# Patient Record
Sex: FEMALE | Race: WHITE | Employment: UNEMPLOYED | URBAN - METROPOLITAN AREA
[De-identification: names, ages, dates, MRNs, and addresses within clinical notes are randomized per-mention and may not be internally consistent; named-entity substitution may affect disease eponyms.]

---

## 2024-10-01 ENCOUNTER — APPOINTMENT (OUTPATIENT)
Dept: LAB | Facility: CLINIC | Age: 64
End: 2024-10-01

## 2024-10-01 ENCOUNTER — OFFICE VISIT (OUTPATIENT)
Dept: FAMILY MEDICINE CLINIC | Facility: CLINIC | Age: 64
End: 2024-10-01

## 2024-10-01 VITALS
WEIGHT: 160.4 LBS | OXYGEN SATURATION: 99 % | HEART RATE: 72 BPM | RESPIRATION RATE: 18 BRPM | BODY MASS INDEX: 32.34 KG/M2 | DIASTOLIC BLOOD PRESSURE: 92 MMHG | TEMPERATURE: 98.2 F | SYSTOLIC BLOOD PRESSURE: 164 MMHG | HEIGHT: 59 IN

## 2024-10-01 DIAGNOSIS — Z11.1 ENCOUNTER FOR SCREENING FOR RESPIRATORY TUBERCULOSIS: ICD-10-CM

## 2024-10-01 DIAGNOSIS — I10 PRIMARY HYPERTENSION: Primary | ICD-10-CM

## 2024-10-01 DIAGNOSIS — E66.09 CLASS 1 OBESITY DUE TO EXCESS CALORIES WITH SERIOUS COMORBIDITY AND BODY MASS INDEX (BMI) OF 32.0 TO 32.9 IN ADULT: ICD-10-CM

## 2024-10-01 DIAGNOSIS — E66.811 CLASS 1 OBESITY DUE TO EXCESS CALORIES WITH SERIOUS COMORBIDITY AND BODY MASS INDEX (BMI) OF 32.0 TO 32.9 IN ADULT: ICD-10-CM

## 2024-10-01 DIAGNOSIS — I10 PRIMARY HYPERTENSION: ICD-10-CM

## 2024-10-01 LAB
BASOPHILS # BLD AUTO: 0.03 THOUSANDS/ΜL (ref 0–0.1)
BASOPHILS NFR BLD AUTO: 0 % (ref 0–1)
EOSINOPHIL # BLD AUTO: 0.26 THOUSAND/ΜL (ref 0–0.61)
EOSINOPHIL NFR BLD AUTO: 3 % (ref 0–6)
ERYTHROCYTE [DISTWIDTH] IN BLOOD BY AUTOMATED COUNT: 13.2 % (ref 11.6–15.1)
EST. AVERAGE GLUCOSE BLD GHB EST-MCNC: 123 MG/DL
HBA1C MFR BLD: 5.9 %
HCT VFR BLD AUTO: 42.4 % (ref 34.8–46.1)
HGB BLD-MCNC: 13.5 G/DL (ref 11.5–15.4)
IMM GRANULOCYTES # BLD AUTO: 0.04 THOUSAND/UL (ref 0–0.2)
IMM GRANULOCYTES NFR BLD AUTO: 1 % (ref 0–2)
LYMPHOCYTES # BLD AUTO: 2.28 THOUSANDS/ΜL (ref 0.6–4.47)
LYMPHOCYTES NFR BLD AUTO: 27 % (ref 14–44)
MCH RBC QN AUTO: 30.4 PG (ref 26.8–34.3)
MCHC RBC AUTO-ENTMCNC: 31.8 G/DL (ref 31.4–37.4)
MCV RBC AUTO: 96 FL (ref 82–98)
MONOCYTES # BLD AUTO: 0.76 THOUSAND/ΜL (ref 0.17–1.22)
MONOCYTES NFR BLD AUTO: 9 % (ref 4–12)
NEUTROPHILS # BLD AUTO: 5.11 THOUSANDS/ΜL (ref 1.85–7.62)
NEUTS SEG NFR BLD AUTO: 60 % (ref 43–75)
NRBC BLD AUTO-RTO: 0 /100 WBCS
PLATELET # BLD AUTO: 310 THOUSANDS/UL (ref 149–390)
PMV BLD AUTO: 11.2 FL (ref 8.9–12.7)
RBC # BLD AUTO: 4.44 MILLION/UL (ref 3.81–5.12)
WBC # BLD AUTO: 8.48 THOUSAND/UL (ref 4.31–10.16)

## 2024-10-01 PROCEDURE — 99204 OFFICE O/P NEW MOD 45 MIN: CPT | Performed by: FAMILY MEDICINE

## 2024-10-01 PROCEDURE — 80053 COMPREHEN METABOLIC PANEL: CPT

## 2024-10-01 PROCEDURE — 83036 HEMOGLOBIN GLYCOSYLATED A1C: CPT

## 2024-10-01 PROCEDURE — 85025 COMPLETE CBC W/AUTO DIFF WBC: CPT

## 2024-10-01 PROCEDURE — 80061 LIPID PANEL: CPT

## 2024-10-01 PROCEDURE — 86480 TB TEST CELL IMMUN MEASURE: CPT

## 2024-10-01 PROCEDURE — 36415 COLL VENOUS BLD VENIPUNCTURE: CPT

## 2024-10-01 RX ORDER — CANDESARTAN 16 MG/1
16 TABLET ORAL DAILY
COMMUNITY
End: 2024-10-01 | Stop reason: SDUPTHER

## 2024-10-01 RX ORDER — CANDESARTAN 16 MG/1
16 TABLET ORAL DAILY
Qty: 30 TABLET | Refills: 2 | Status: SHIPPED | OUTPATIENT
Start: 2024-10-01

## 2024-10-01 NOTE — PROGRESS NOTES
Ambulatory Visit  Name: Cornelia Tirado      : 1960      MRN: [id number]  Encounter Provider: Shukri Mckeon MD  Encounter Date: 10/1/2024   Encounter department: [facility]    Assessment & Plan  Primary hypertension  Blood pressure elevated today  Patient did not take medications today  Counseled patient on importance of medication adherence  Continue candesartan  Counseled on DASH diet    Orders:    candesartan (ATACAND) 16 mg tablet; Take 1 tablet (16 mg total) by mouth daily    Comprehensive metabolic panel; Future    CBC and differential; Future    Lipid panel; Future    Hemoglobin A1C; Future    Encounter for screening for respiratory tuberculosis    Orders:    Quantiferon TB Gold Plus Assay; Future    Class 1 obesity due to excess calories with serious comorbidity and body mass index (BMI) of 32.0 to 32.9 in adult         BMI Counseling: Body mass index is 32.67 kg/m². The BMI is above normal. Nutrition recommendations include decreasing portion sizes, encouraging healthy choices of fruits and vegetables, decreasing fast food intake, consuming healthier snacks, limiting drinks that contain sugar, moderation in carbohydrate intake and reducing intake of cholesterol. Exercise recommendations include moderate physical activity 150 minutes/week. Rationale for BMI follow-up plan is due to patient being overweight or obese.     Depression Screening and Follow-up Plan: Patient was screened for depression during today's encounter. They screened negative with a PHQ-2 score of 0.        History of Present Illness     [facility] used for the visit    64-year-old female with a history of hypertension who presents today to [facility] care.  Patient requesting lab test for tuberculosis for employment purposes.  Patient is new to the country from [place].  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  Patient  "is hoping that she will have medical insurance through her employer in the next few months.  She is a lifetime non-smoker.  She does not drink alcohol.  She does not use any recreational drugs.  She eats to a healthy well-balanced diet.  She does not exercise often      Review of Systems   Constitutional:  Negative for chills, diaphoresis, fatigue and fever.   HENT:  Negative for congestion and rhinorrhea.    Eyes:  Negative for visual disturbance.   Respiratory:  Negative for cough, shortness of breath and wheezing.    Cardiovascular:  Negative for chest pain, palpitations and leg swelling.   Gastrointestinal:  Negative for abdominal pain, constipation, diarrhea, nausea and vomiting.   Endocrine: Negative for polydipsia, polyphagia and polyuria.   Genitourinary:  Negative for dysuria, hematuria and urgency.   Musculoskeletal:  Negative for arthralgias and gait problem.   Skin:  Negative for rash.   Neurological:  Negative for syncope, weakness, light-headedness, numbness and headaches.   Psychiatric/Behavioral:  Negative for dysphoric mood.      History reviewed. No pertinent past medical history.  History reviewed. No pertinent surgical history.  History reviewed. No pertinent family history.  Social History     Tobacco Use    Smoking status: Never    Smokeless tobacco: Never   Vaping Use    Vaping status: Never Used   Substance and Sexual Activity    Alcohol use: Never    Drug use: Never    Sexual activity: Not on file     Current Outpatient Medications on File Prior to Visit   Medication Sig    [DISCONTINUED] candesartan (ATACAND) 16 mg tablet Take 16 mg by mouth daily     No Known Allergies    There is no immunization history on file for this patient.  Objective     /92 (BP Location: Right arm, Patient Position: Sitting, Cuff Size: Large) Comment: Pt has not taken Bp meds today  Pulse 72   Temp 98.2 °F (36.8 °C) (Temporal)   Resp 18   Ht 4' 10.75\" (1.492 m)   Wt 72.8 kg (160 lb 6.4 oz)   SpO2 99%   " BMI 32.67 kg/m²     Physical Exam  Constitutional:       General: She is not in acute distress.     Appearance: Normal appearance. She is well-developed. She is obese. She is not ill-appearing, toxic-appearing or diaphoretic.   HENT:      Head: Normocephalic and atraumatic.      Right Ear: External ear normal.      Left Ear: External ear normal.      Mouth/Throat:      Pharynx: No oropharyngeal exudate.   Eyes:      General: No scleral icterus.        Right eye: No discharge.         Left eye: No discharge.      Extraocular Movements: Extraocular movements intact.      Pupils: Pupils are equal, round, and reactive to light.   Cardiovascular:      Rate and Rhythm: Normal rate and regular rhythm.      Heart sounds: Normal heart sounds. No murmur heard.     No friction rub. No gallop.   Pulmonary:      Effort: Pulmonary effort is normal. No respiratory distress.      Breath sounds: Normal breath sounds. No stridor. No wheezing or rhonchi.   Abdominal:      General: Bowel sounds are normal. There is no distension.      Palpations: Abdomen is soft. There is no mass.      Tenderness: There is no abdominal tenderness. There is no guarding.   Musculoskeletal:         General: Normal range of motion.      Cervical back: Normal range of motion.   Skin:     General: Skin is warm.      Capillary Refill: Capillary refill takes less than 2 seconds.   Neurological:      General: No focal deficit present.      Mental Status: She is alert and oriented to person, place, and time.      Cranial Nerves: No cranial nerve deficit.      Motor: No weakness.      Gait: Gait normal.   Psychiatric:         Mood and Affect: Mood normal.

## 2024-10-01 NOTE — ASSESSMENT & PLAN NOTE
Blood pressure elevated today  Patient did not take medications today  Counseled patient on importance of medication adherence  Continue candesartan  Counseled on DASH diet    Orders:    candesartan (ATACAND) 16 mg tablet; Take 1 tablet (16 mg total) by mouth daily    Comprehensive metabolic panel; Future    CBC and differential; Future    Lipid panel; Future    Hemoglobin A1C; Future

## 2024-10-02 LAB
ALBUMIN SERPL BCG-MCNC: 4.3 G/DL (ref 3.5–5)
ALP SERPL-CCNC: 73 U/L (ref 34–104)
ALT SERPL W P-5'-P-CCNC: 14 U/L (ref 7–52)
ANION GAP SERPL CALCULATED.3IONS-SCNC: 7 MMOL/L (ref 4–13)
AST SERPL W P-5'-P-CCNC: 19 U/L (ref 13–39)
BILIRUB SERPL-MCNC: 0.36 MG/DL (ref 0.2–1)
BUN SERPL-MCNC: 15 MG/DL (ref 5–25)
CALCIUM SERPL-MCNC: 9.9 MG/DL (ref 8.4–10.2)
CHLORIDE SERPL-SCNC: 102 MMOL/L (ref 96–108)
CHOLEST SERPL-MCNC: 202 MG/DL
CO2 SERPL-SCNC: 31 MMOL/L (ref 21–32)
CREAT SERPL-MCNC: 0.68 MG/DL (ref 0.6–1.3)
GAMMA INTERFERON BACKGROUND BLD IA-ACNC: <0 IU/ML
GFR SERPL CREATININE-BSD FRML MDRD: 93 ML/MIN/1.73SQ M
GLUCOSE P FAST SERPL-MCNC: 89 MG/DL (ref 65–99)
HDLC SERPL-MCNC: 46 MG/DL
LDLC SERPL CALC-MCNC: 133 MG/DL (ref 0–100)
M TB IFN-G BLD-IMP: NEGATIVE
M TB IFN-G CD4+ BCKGRND COR BLD-ACNC: 0 IU/ML
M TB IFN-G CD4+ BCKGRND COR BLD-ACNC: 0.03 IU/ML
MITOGEN IGNF BCKGRD COR BLD-ACNC: 10 IU/ML
NONHDLC SERPL-MCNC: 156 MG/DL
POTASSIUM SERPL-SCNC: 4.3 MMOL/L (ref 3.5–5.3)
PROT SERPL-MCNC: 7.6 G/DL (ref 6.4–8.4)
SODIUM SERPL-SCNC: 140 MMOL/L (ref 135–147)
TRIGL SERPL-MCNC: 117 MG/DL

## 2024-10-09 ENCOUNTER — OFFICE VISIT (OUTPATIENT)
Dept: FAMILY MEDICINE CLINIC | Facility: CLINIC | Age: 64
End: 2024-10-09

## 2024-10-09 VITALS
BODY MASS INDEX: 33.17 KG/M2 | OXYGEN SATURATION: 98 % | HEART RATE: 86 BPM | TEMPERATURE: 97.8 F | DIASTOLIC BLOOD PRESSURE: 86 MMHG | WEIGHT: 158 LBS | RESPIRATION RATE: 18 BRPM | HEIGHT: 58 IN | SYSTOLIC BLOOD PRESSURE: 142 MMHG

## 2024-10-09 DIAGNOSIS — L02.91 ABSCESS: Primary | ICD-10-CM

## 2024-10-09 DIAGNOSIS — B35.4 TINEA CORPORIS: ICD-10-CM

## 2024-10-09 PROCEDURE — 99214 OFFICE O/P EST MOD 30 MIN: CPT

## 2024-10-09 RX ORDER — SULFAMETHOXAZOLE/TRIMETHOPRIM 800-160 MG
1 TABLET ORAL 2 TIMES DAILY
Qty: 14 TABLET | Refills: 0 | Status: SHIPPED | OUTPATIENT
Start: 2024-10-09 | End: 2024-10-16

## 2024-10-09 RX ORDER — KETOCONAZOLE 20 MG/G
CREAM TOPICAL DAILY
Qty: 60 G | Refills: 0 | Status: SHIPPED | OUTPATIENT
Start: 2024-10-09

## 2024-10-09 NOTE — PROGRESS NOTES
"Ambulatory Visit  Name: Cornelia Tirado      : 1960      MRN: [id number]  Encounter Provider: ASHWINI Monaco  Encounter Date: 10/9/2024   Encounter department: [facility]    Assessment & Plan  Abscess    Orders:    sulfamethoxazole-trimethoprim (BACTRIM DS) 800-160 mg per tablet; Take 1 tablet by mouth 2 (two) times a day for 7 days    Tinea corporis    Orders:    ketoconazole (NIZORAL) 2 % cream; Apply topically daily       History of Present Illness     Cornelia Tirado is a 64 y.o. female  has no past medical history on file.  has no past surgical history on file.      Pt presents today with a \"pimple\" on her chest wall the developed 3 weeks ago. Pt's daughter is with the patient and explained that the pustule popped about a week ago and pus and blood was evacuated. Pt has tried bacitracin and peroxide without effectiveness.     Pt also has two areas of raised red rash on BL U/E that have been there for a year, developed at the same time. Denies itching/burning or any discharge.          Review of Systems  As per HPI          Objective     /86 (BP Location: Right arm, Patient Position: Sitting, Cuff Size: Standard)   Pulse 86   Temp 97.8 °F (36.6 °C) (Temporal)   Resp 18   Ht 4' 10\" (1.473 m)   Wt 71.7 kg (158 lb)   SpO2 98%   BMI 33.02 kg/m²     Physical Exam  Vitals and nursing note reviewed.   Constitutional:       General: She is not in acute distress.     Appearance: Normal appearance. She is well-developed. She is obese.   HENT:      Head: Normocephalic and atraumatic.      Right Ear: External ear normal.      Left Ear: External ear normal.      Nose: Nose normal.   Eyes:      Conjunctiva/sclera: Conjunctivae normal.   Cardiovascular:      Rate and Rhythm: Normal rate and regular rhythm.      Pulses: Normal pulses.      Heart sounds: Normal heart sounds. No murmur heard.  Pulmonary:      Effort: Pulmonary effort is normal. No " respiratory distress.      Breath sounds: Normal breath sounds.   Abdominal:      Palpations: Abdomen is soft.      Tenderness: There is no abdominal tenderness.   Musculoskeletal:         General: No swelling. Normal range of motion.      Cervical back: Normal range of motion and neck supple.   Skin:     General: Skin is warm and dry.      Capillary Refill: Capillary refill takes less than 2 seconds.      Findings: Abscess and rash present.             Comments: Pt has blanchable abscess to center of her sternum. Pustule appears pus filled with surrounding erythema.     Circular lesion present to L and R arm. Erythematous, circular, raised plaque.    Neurological:      General: No focal deficit present.      Mental Status: She is alert and oriented to person, place, and time. Mental status is at baseline.   Psychiatric:         Mood and Affect: Mood normal.         Behavior: Behavior normal.         Thought Content: Thought content normal.         Judgment: Judgment normal.

## 2024-10-09 NOTE — PROGRESS NOTES
"Ambulatory Visit  Name: Cornelia Tirado      : 1960      MRN: [id number]  Encounter Provider: ASHWINI Monaco  Encounter Date: 10/9/2024   Encounter department: [facility]    Assessment & Plan  Abscess    Orders:    sulfamethoxazole-trimethoprim (BACTRIM DS) 800-160 mg per tablet; Take 1 tablet by mouth 2 (two) times a day for 7 days    Tinea corporis    Orders:    ketoconazole (NIZORAL) 2 % cream; Apply topically daily       History of Present Illness     Cornelia Tirado is a 64 y.o. female  has no past medical history on file.  has no past surgical history on file.      Pt presents today with a \"pimple\" on her chest wall the developed 3 weeks ago. Pt's daughter is with the patient and explained that the pustule popped about a week ago and pus and blood was evacuated. Pt has tried bacitracin and peroxide without effectiveness.     Pt also has two areas of raised red rash on BL U/E that have been there for a year, developed at the same time. Denies itching/burning or any discharge.    Rash          Review of Systems  As per HPI          Objective     /86 (BP Location: Right arm, Patient Position: Sitting, Cuff Size: Standard)   Pulse 86   Temp 97.8 °F (36.6 °C) (Temporal)   Resp 18   Ht 4' 10\" (1.473 m)   Wt 71.7 kg (158 lb)   SpO2 98%   BMI 33.02 kg/m²     Physical Exam  Vitals and nursing note reviewed.   Constitutional:       General: She is not in acute distress.     Appearance: Normal appearance. She is well-developed. She is obese.   HENT:      Head: Normocephalic and atraumatic.      Right Ear: External ear normal.      Left Ear: External ear normal.      Nose: Nose normal.   Eyes:      Conjunctiva/sclera: Conjunctivae normal.   Cardiovascular:      Rate and Rhythm: Normal rate and regular rhythm.      Pulses: Normal pulses.      Heart sounds: Normal heart sounds. No murmur heard.  Pulmonary:      Effort: Pulmonary effort is " normal. No respiratory distress.      Breath sounds: Normal breath sounds.   Abdominal:      Palpations: Abdomen is soft.      Tenderness: There is no abdominal tenderness.   Musculoskeletal:         General: No swelling. Normal range of motion.      Cervical back: Normal range of motion and neck supple.   Skin:     General: Skin is warm and dry.      Capillary Refill: Capillary refill takes less than 2 seconds.      Findings: Abscess and rash present.             Comments: Pt has blanchable abscess to center of her sternum. Pustule appears pus filled with surrounding erythema.     Circular lesion present to L and R arm. Erythematous, circular, raised plaque.    Neurological:      General: No focal deficit present.      Mental Status: She is alert and oriented to person, place, and time. Mental status is at baseline.   Psychiatric:         Mood and Affect: Mood normal.         Behavior: Behavior normal.         Thought Content: Thought content normal.         Judgment: Judgment normal.          consistent carbohydrate (no snacks)

## 2024-11-15 ENCOUNTER — TELEPHONE (OUTPATIENT)
Dept: FAMILY MEDICINE CLINIC | Facility: CLINIC | Age: 64
End: 2024-11-15

## 2024-11-19 ENCOUNTER — OFFICE VISIT (OUTPATIENT)
Dept: FAMILY MEDICINE CLINIC | Facility: CLINIC | Age: 64
End: 2024-11-19

## 2024-11-19 VITALS
BODY MASS INDEX: 32.94 KG/M2 | RESPIRATION RATE: 16 BRPM | HEIGHT: 58 IN | WEIGHT: 156.9 LBS | HEART RATE: 55 BPM | OXYGEN SATURATION: 96 % | DIASTOLIC BLOOD PRESSURE: 86 MMHG | TEMPERATURE: 98.2 F | SYSTOLIC BLOOD PRESSURE: 146 MMHG

## 2024-11-19 DIAGNOSIS — L02.91 ABSCESS: ICD-10-CM

## 2024-11-19 DIAGNOSIS — R73.03 PREDIABETES: ICD-10-CM

## 2024-11-19 DIAGNOSIS — I10 PRIMARY HYPERTENSION: Primary | ICD-10-CM

## 2024-11-19 DIAGNOSIS — R21 SKIN RASH: ICD-10-CM

## 2024-11-19 PROCEDURE — 99214 OFFICE O/P EST MOD 30 MIN: CPT | Performed by: FAMILY MEDICINE

## 2024-11-19 RX ORDER — PRENATAL VIT 91/IRON/FOLIC/DHA 28-975-200
COMBINATION PACKAGE (EA) ORAL
Qty: 42 G | Refills: 1 | Status: SHIPPED | OUTPATIENT
Start: 2024-11-19 | End: 2024-11-26

## 2024-11-19 NOTE — PROGRESS NOTES
Name: Cornelia Tirado      : 1960      MRN: [id number]  Encounter Provider: Shukri Mckeon MD  Encounter Date: 2024   Encounter department: [facility]  :  Assessment & Plan  Primary hypertension  Blood pressure at goal of <150/90  Continue candesartan  DASH diet discussed with patient         Abscess  Chest wall abscess   Resolved with oral antibiotics and warm compresses         Skin rash  Skin lesion right forearm- 1 year onset  Asymptomatic  No improvement with after a few weeks of ketoconazole cream  Trial of lamisil for 7 days to see if symptoms improve  Referral to derm per patient request      Orders:    Ambulatory Referral to Dermatology; Future    terbinafine (LamISIL) 1 % cream; Apply topically daily at bedtime for 7 days    Prediabetes  HBA1C of 5.9  Low carbohydrate counseling provided   Recommend exercise daily                History of Present Illness     65 y/o female with a history of prediabetes and HTN presents today for follow up.  Patient recently diagnosed with abscess on her chest wall.  She was recently prescribed oral antibiotics and she use warm compresses.  The abscess has completely resolved.  Patient also reports of a rash on her right forearm that has been present for over a year.  She was recently treated with antifungal cream which she has been using for the past couple of weeks and it did not help.  She would like to see a specialist for further evaluation.  She does not have any medical insurance      Review of Systems   Constitutional:  Negative for chills, diaphoresis, fatigue and fever.   Respiratory:  Negative for shortness of breath and wheezing.    Cardiovascular:  Negative for chest pain and palpitations.   Gastrointestinal:  Negative for abdominal pain, nausea and vomiting.   Genitourinary:  Negative for dysuria, hematuria and urgency.   Musculoskeletal:  Negative for arthralgias.   Skin:  Positive for rash.  "  Neurological:  Negative for seizures and syncope.          Objective   /86 (BP Location: Left arm, Patient Position: Sitting, Cuff Size: Standard) Comment: pt did not take bp meds today  Pulse 55   Temp 98.2 °F (36.8 °C) (Temporal)   Resp 16   Ht 4' 10\" (1.473 m)   Wt 71.2 kg (156 lb 14.4 oz)   SpO2 96%   BMI 32.79 kg/m²      Physical Exam  Vitals and nursing note reviewed.   Constitutional:       General: She is not in acute distress.     Appearance: Normal appearance. She is well-developed. She is obese. She is not ill-appearing, toxic-appearing or diaphoretic.   HENT:      Head: Normocephalic and atraumatic.      Right Ear: External ear normal.      Left Ear: External ear normal.      Nose: Nose normal.   Eyes:      Conjunctiva/sclera: Conjunctivae normal.   Cardiovascular:      Rate and Rhythm: Normal rate and regular rhythm.      Pulses: Normal pulses.      Heart sounds: Normal heart sounds. No murmur heard.     No friction rub. No gallop.   Pulmonary:      Effort: Pulmonary effort is normal. No respiratory distress.      Breath sounds: Normal breath sounds.   Abdominal:      Palpations: Abdomen is soft.      Tenderness: There is no abdominal tenderness.   Musculoskeletal:         General: No swelling. Normal range of motion.      Cervical back: Normal range of motion.   Skin:     General: Skin is warm and dry.      Capillary Refill: Capillary refill takes less than 2 seconds.      Findings: Rash (raised circular lesion on right forearm) present.          Neurological:      General: No focal deficit present.      Mental Status: She is alert and oriented to person, place, and time. Mental status is at baseline.   Psychiatric:         Mood and Affect: Mood normal.         Behavior: Behavior normal.         Thought Content: Thought content normal.         Judgment: Judgment normal.         "

## 2024-12-02 NOTE — PROGRESS NOTES
"Name: Cornelia Tirado      : 1960      MRN: [id number]  Encounter Provider: Peter Cotton MD  Encounter Date: 12/3/2024   Encounter department: [facility]  :  Assessment & Plan  High risk heterosexual behavior  Pelvic exam consistent with physiologic discharge  No internal or external lesions identified  Microscopy negative for signs of infection  Counseled regarding \"hot\" vaginal discharge likely secondary to irritation to white vinegar vaginal cleansing with recommendation for cessation  Encouraged to follow-up if symptoms do not improve or worsen  Orders:    POCT wet mount    HIV 1/2 AG/AB w Reflex [facility] for 2 yr old and above; Future    RPR-Syphilis Screening (Total Syphilis IGG/IGM); Future    Hepatitis C antibody; Future    Hepatitis B surface antigen; Future    Chlamydia/GC amplified DNA by PCR    Screen for STD (sexually transmitted disease)    Orders:    Chlamydia/GC amplified DNA by PCR        History of Present Illness     HPI  Cornelia Tirado is a 64 y.o. female who presents for STD/STI testing after recent sexual encounter. Conversation completed in Maldivian per patient preference, assistance obtained with [employer] Maldivian interpretor. Cornelia reports recent sexual activity with one male partner she met over the internet approximately 25 days ago. Last prior sexual encounter >10 years prior. Cornelia was nervous about potential STD exposure and performed vaginal cleansing with white vinegar. She reports a persistent \"hot\" feeling and vulvar redness since then. Denies vaginal or vulvar pain. Denies abnormal vaginal discharge or other associated symptoms. After examination, we reviewed benign exam without apparent lesions or abnormal findings. We discussed completing blood work for a complete exposure assessment. Counseled regarding appropriate vaginal hygiene with recommendation for cessation of vinegar cleansing and avoiding products with fragrance or " "chemicals. Discussed only cleaning vagina with water after sexual activity. Encouraged loose fitting pants and porous underwear. All questions answered.      Review of Systems   Constitutional:  Negative for chills and fever.   HENT:  Negative for ear pain and sore throat.    Eyes:  Negative for pain and visual disturbance.   Respiratory:  Negative for cough and shortness of breath.    Cardiovascular:  Negative for chest pain and palpitations.   Gastrointestinal:  Negative for abdominal pain and vomiting.   Genitourinary:  Negative for difficulty urinating, dyspareunia, dysuria, frequency, hematuria, pelvic pain, urgency, vaginal bleeding, vaginal discharge and vaginal pain.   Musculoskeletal:  Negative for arthralgias and back pain.   Skin:  Negative for color change and rash.   Neurological:  Negative for seizures and syncope.   All other systems reviewed and are negative.    Past Medical History   History reviewed. No pertinent past medical history.  History reviewed. No pertinent surgical history.  History reviewed. No pertinent family history.   reports that she has never smoked. She has never been exposed to tobacco smoke. She has never used smokeless tobacco. She reports that she does not drink alcohol and does not use drugs.  Current Outpatient Medications on File Prior to Visit   Medication Sig Dispense Refill    candesartan (ATACAND) 16 mg tablet Take 1 tablet (16 mg total) by mouth daily 30 tablet 2    ketoconazole (NIZORAL) 2 % cream Apply topically daily 60 g 0    terbinafine (LamISIL) 1 % cream Apply topically daily at bedtime for 7 days 42 g 1     No current facility-administered medications on file prior to visit.   No Known Allergies        Objective   BP (!) 181/95 (BP Location: Left arm, Patient Position: Sitting, Cuff Size: Standard)   Pulse 72   Ht 4' 10\" (1.473 m)   Wt 70.3 kg (155 lb)   BMI 32.40 kg/m²      Physical Exam  Vitals and nursing note reviewed. Exam conducted with a chaperone " present.   Constitutional:       General: She is not in acute distress.  HENT:      Head: Normocephalic.      Right Ear: External ear normal.      Left Ear: External ear normal.   Eyes:      General: No scleral icterus.        Right eye: No discharge.         Left eye: No discharge.      Conjunctiva/sclera: Conjunctivae normal.   Cardiovascular:      Rate and Rhythm: Normal rate and regular rhythm.      Pulses: Normal pulses.      Heart sounds: Normal heart sounds.   Pulmonary:      Effort: Pulmonary effort is normal. No respiratory distress.      Breath sounds: Normal breath sounds.   Abdominal:      General: Abdomen is flat. There is no distension.      Palpations: Abdomen is soft.      Tenderness: There is no abdominal tenderness. There is no guarding.   Genitourinary:     General: Normal vulva.      Vagina: No vaginal discharge.      Comments: Speculum exam benign, physiologic discharge  No apparent lesions or erythema within the vaginal vault  Vulva and labia without excoriations, erythema, or inflammation  No apparent external lesions  Musculoskeletal:         General: No swelling or tenderness. Normal range of motion.      Cervical back: Normal range of motion.      Right lower leg: No edema.      Left lower leg: No edema.   Skin:     General: Skin is warm and dry.      Capillary Refill: Capillary refill takes less than 2 seconds.   Neurological:      Mental Status: She is alert and oriented to person, place, and time. Mental status is at baseline.   Psychiatric:         Mood and Affect: Mood normal.         Behavior: Behavior normal.         Administrative Statements   I have spent a total time of 30 minutes in caring for this patient on the day of the visit/encounter including Diagnostic results, Prognosis, Risks and benefits of tx options, Instructions for management, Patient and family education, Importance of tx compliance, Risk factor reductions, Impressions, Counseling / Coordination of care,  Documenting in the medical record, Reviewing / ordering tests, medicine, procedures  , Obtaining or reviewing history  , and Communicating with other healthcare professionals .

## 2024-12-03 ENCOUNTER — APPOINTMENT (OUTPATIENT)
Dept: LAB | Facility: CLINIC | Age: 64
End: 2024-12-03

## 2024-12-03 ENCOUNTER — OFFICE VISIT (OUTPATIENT)
Dept: OBGYN CLINIC | Facility: CLINIC | Age: 64
End: 2024-12-03

## 2024-12-03 VITALS
SYSTOLIC BLOOD PRESSURE: 181 MMHG | HEIGHT: 58 IN | BODY MASS INDEX: 32.54 KG/M2 | WEIGHT: 155 LBS | DIASTOLIC BLOOD PRESSURE: 95 MMHG | HEART RATE: 72 BPM

## 2024-12-03 DIAGNOSIS — Z11.3 SCREEN FOR STD (SEXUALLY TRANSMITTED DISEASE): ICD-10-CM

## 2024-12-03 DIAGNOSIS — Z72.51 HIGH RISK HETEROSEXUAL BEHAVIOR: ICD-10-CM

## 2024-12-03 DIAGNOSIS — Z72.51 HIGH RISK HETEROSEXUAL BEHAVIOR: Primary | ICD-10-CM

## 2024-12-03 LAB
BV WHIFF TEST VAG QL: NEGATIVE
CLUE CELLS SPEC QL WET PREP: NEGATIVE
PH SMN: 5 [PH]
SL AMB POCT WET MOUNT: NEGATIVE
T VAGINALIS VAG QL WET PREP: NEGATIVE
YEAST VAG QL WET PREP: NEGATIVE

## 2024-12-03 PROCEDURE — 99203 OFFICE O/P NEW LOW 30 MIN: CPT | Performed by: OBSTETRICS & GYNECOLOGY

## 2024-12-03 PROCEDURE — 36415 COLL VENOUS BLD VENIPUNCTURE: CPT

## 2024-12-03 PROCEDURE — 86803 HEPATITIS C AB TEST: CPT

## 2024-12-03 PROCEDURE — 87210 SMEAR WET MOUNT SALINE/INK: CPT | Performed by: OBSTETRICS & GYNECOLOGY

## 2024-12-03 PROCEDURE — 87389 HIV-1 AG W/HIV-1&-2 AB AG IA: CPT

## 2024-12-03 PROCEDURE — 87340 HEPATITIS B SURFACE AG IA: CPT

## 2024-12-03 PROCEDURE — 86780 TREPONEMA PALLIDUM: CPT

## 2024-12-03 PROCEDURE — 87491 CHLMYD TRACH DNA AMP PROBE: CPT

## 2024-12-03 PROCEDURE — 87591 N.GONORRHOEAE DNA AMP PROB: CPT

## 2024-12-04 ENCOUNTER — OFFICE VISIT (OUTPATIENT)
Dept: INTERNAL MEDICINE CLINIC | Facility: CLINIC | Age: 64
End: 2024-12-04

## 2024-12-04 ENCOUNTER — TELEPHONE (OUTPATIENT)
Dept: INTERNAL MEDICINE CLINIC | Facility: CLINIC | Age: 64
End: 2024-12-04

## 2024-12-04 VITALS
BODY MASS INDEX: 31.02 KG/M2 | DIASTOLIC BLOOD PRESSURE: 98 MMHG | WEIGHT: 158 LBS | HEIGHT: 60 IN | SYSTOLIC BLOOD PRESSURE: 166 MMHG | HEART RATE: 66 BPM

## 2024-12-04 DIAGNOSIS — Z76.89 ENCOUNTER TO ESTABLISH CARE: Primary | ICD-10-CM

## 2024-12-04 DIAGNOSIS — R73.03 PREDIABETES: ICD-10-CM

## 2024-12-04 DIAGNOSIS — I10 PRIMARY HYPERTENSION: ICD-10-CM

## 2024-12-04 DIAGNOSIS — Z13.9 SCREENING DUE: ICD-10-CM

## 2024-12-04 LAB
HBV SURFACE AG SER QL: NORMAL
HCV AB SER QL: NORMAL
HIV 1+2 AB+HIV1 P24 AG SERPL QL IA: NORMAL
HIV 2 AB SERPL QL IA: NORMAL
HIV1 AB SERPL QL IA: NORMAL
HIV1 P24 AG SERPL QL IA: NORMAL
TREPONEMA PALLIDUM IGG+IGM AB [PRESENCE] IN SERUM OR PLASMA BY IMMUNOASSAY: NORMAL

## 2024-12-04 RX ORDER — AMLODIPINE BESYLATE 5 MG/1
5 TABLET ORAL DAILY
Qty: 30 TABLET | Refills: 3 | Status: SHIPPED | OUTPATIENT
Start: 2024-12-04

## 2024-12-04 NOTE — TELEPHONE ENCOUNTER
Folder Color: Blue     Name of Form:  Staff Health Assessment     Form to be filled out by: Tyler     Form to be Faxed: (fax number), mailed (address), or picked up (by whom)     Patient made aware of 10 day business policy.

## 2024-12-04 NOTE — PATIENT INSTRUCTIONS
Please take amlodipine 5 mg daily in addition to your candesartan 16 mg.     Follow up in 6 weeks for your elevated blood pressure.

## 2024-12-04 NOTE — PROGRESS NOTES
Adult Annual Physical  Name: Cornelia Tirado      : 1960      MRN: [id number]  Encounter Provider: Rupali Scott DO  Encounter Date: 2024   Encounter department: [facility]    Assessment & Plan  Encounter to establish care  Cornelia Tirado is a 64-year-old female with a past medical history of hypertension, hyperlipidemia, prediabetes.  She presented to the office today for establishment of care and an annual physical.  Patient would like to transfer her care as well as receive a physical for new job.  Patient works in childcare.  Patient denies any acute concerns at this time.     Plan:  Follow-up in 6 weeks for recheck of blood pressure after starting new medication       Primary hypertension  Patient has a history of hypertension.  Patient has been taking Zartan 16 mg daily.  Patient reports taking blood pressures at home.  Patient's blood pressure has been elevated at multiple visits.  Patient's repeat blood pressure today was 140/98.  Patient was educated on the importance of low-sodium diet and continuing taking her medication daily. Patient was informed that there were changes to her funduscopic exam of her eye due to her elevated blood pressures.    Plan:  Continue candesartan 60 mg daily  Start taking amlodipine 5 mg daily  Continue to monitor blood pressures at home  Return to the office in 6 weeks to recheck blood pressure  Encouraged low-sodium diet      Orders:    amLODIPine (NORVASC) 5 mg tablet; Take 1 tablet (5 mg total) by mouth daily    Prediabetes  Patient had lab work done on 10/1 and was found to have prediabetes with a hemoglobin A1c of 5.9.  Patient reports not being aware of this diagnosis.  Patient was informed the importance of diet and exercise to help improve her hemoglobin A1c.  Patient reports no known family history of diabetes.  Patient was educated on the progression of prediabetes to diabetes and long-term  effects.    Plan:  Continue to monitor patient's hemoglobin A1c  Continue to encourage lifestyle modifications         Screening due  Patient's records report cervical cancer screening, breast cancer screening, and colorectal cancer screening needed.  Patient was reports completing mammogram within the last year.  Patient reports her last colonoscopy was less than 10 years ago.  Patient received her care in the [place].     Plan:  Patient was asked to bring documentation of her prior screening to next office visit       Immunizations and preventive care screenings were discussed with patient today. Appropriate education was printed on patient's after visit summary.    Counseling:  Injury prevention: discussed safety/seat belts, safety helmets, smoke detectors, carbon monoxide detectors, and smoking near bedding or upholstery.  Exercise: the importance of regular exercise/physical activity was discussed. Recommend exercise 3-5 times per week for at least 30 minutes.          History of Present Illness     Adult Annual Physical:  Patient presents for annual physical. Patient is a 64 y.o. old female with a past medical history of hypertension and prediabetes.  Patient reports that she is feeling well today but wanted to establish care with a new physician.  Patient presents for annual wellness physical as well as new employment paperwork.  Patient reports that she will be working in a new job with childcare and needs paperwork filled out.  Patient reports no family history that is known in either her parents or siblings or children.  Patient denies family history of diabetes, hypertension, cardiac disease, cancer.  Patient reports that her only medication is candesartan which she takes daily.  Patient reports that she does not exercise but that she does adhere to a low-sodium diet.  Patient reports that she is aware she has hypertension but was unaware of her elevated hemoglobin A1c.  Patient was given  extensive information about prediabetes.  Patient denies tobacco use, alcohol use, or recreational drug use.  Patient reports that she is up-to-date on her colonoscopy, mammogram, and cervical cancer screening.  Patient reports that she received her care in the [place] with her mammogram in the last year and her colonoscopy less than 10 years ago.  Patient was requested to bring paperwork if she has information of the exact dates of her screenings and/or reports.  Patient was educated on the new medication she was started on, amlodipine.  Patient was asked to return to the office in 6 weeks to monitor her blood pressure.  Patient denies fever, chills, chest pain, palpitations, shortness of breath, abdominal pain, diarrhea, constipation..     Diet and Physical Activity:  - Diet/Nutrition: heart healthy (low sodium) diet.  - Exercise: no formal exercise.    General Health:  - Sleep: sleeps well.  - Vision: no vision problems.    /GYN Health:  - Follows with GYN: yes.     Review of Systems   Constitutional:  Negative for chills, fatigue and fever.   Respiratory:  Negative for chest tightness and shortness of breath.    Cardiovascular:  Negative for chest pain and palpitations.   Gastrointestinal:  Negative for abdominal pain, constipation and diarrhea.   Neurological:  Negative for headaches.         Objective   /98 (BP Location: Left arm, Patient Position: Sitting, Cuff Size: Large)   Pulse 66   Ht 5' (1.524 m)   Wt 71.7 kg (158 lb)   BMI 30.86 kg/m²     Physical Exam  Constitutional:       General: She is not in acute distress.     Appearance: Normal appearance. She is obese. She is not ill-appearing.   HENT:      Head: Normocephalic and atraumatic.      Mouth/Throat:      Mouth: Mucous membranes are moist.   Eyes:      Funduscopic exam:     Right eye: Arteriolar narrowing present.         Left eye: Arteriolar narrowing present.   Cardiovascular:      Rate and Rhythm: Normal rate and regular  rhythm.      Pulses: Normal pulses.      Heart sounds: Normal heart sounds.   Pulmonary:      Effort: Pulmonary effort is normal.      Breath sounds: Normal breath sounds.   Abdominal:      General: Abdomen is flat.      Palpations: Abdomen is soft.   Musculoskeletal:      Right lower leg: No edema.      Left lower leg: No edema.   Skin:     General: Skin is warm and dry.   Neurological:      Mental Status: She is alert and oriented to person, place, and time.   Psychiatric:         Mood and Affect: Mood normal.     Repeat Blood pressure 140/98

## 2024-12-05 LAB
C TRACH DNA SPEC QL NAA+PROBE: NEGATIVE
N GONORRHOEA DNA SPEC QL NAA+PROBE: NEGATIVE

## 2024-12-05 NOTE — ASSESSMENT & PLAN NOTE
Patient had lab work done on 10/1 and was found to have prediabetes with a hemoglobin A1c of 5.9.  Patient reports not being aware of this diagnosis.  Patient was informed the importance of diet and exercise to help improve her hemoglobin A1c.  Patient reports no known family history of diabetes.  Patient was educated on the progression of prediabetes to diabetes and long-term effects.    Plan:  Continue to monitor patient's hemoglobin A1c  Continue to encourage lifestyle modifications

## 2024-12-05 NOTE — ASSESSMENT & PLAN NOTE
Patient has a history of hypertension.  Patient has been taking Zartan 16 mg daily.  Patient reports taking blood pressures at home.  Patient's blood pressure has been elevated at multiple visits.  Patient's repeat blood pressure today was 140/98.  Patient was educated on the importance of low-sodium diet and continuing taking her medication daily. Patient was informed that there were changes to her funduscopic exam of her eye due to her elevated blood pressures.    Plan:  Continue candesartan 60 mg daily  Start taking amlodipine 5 mg daily  Continue to monitor blood pressures at home  Return to the office in 6 weeks to recheck blood pressure  Encouraged low-sodium diet      Orders:    amLODIPine (NORVASC) 5 mg tablet; Take 1 tablet (5 mg total) by mouth daily

## 2024-12-06 ENCOUNTER — RESULTS FOLLOW-UP (OUTPATIENT)
Dept: LABOR AND DELIVERY | Facility: HOSPITAL | Age: 64
End: 2024-12-06

## 2024-12-09 NOTE — TELEPHONE ENCOUNTER
[id number] assisted with informing the pt of her neg results. No further questions needed.      ----- Message from Peter Cotton MD sent at 12/6/2024  3:20 PM EST -----  Please let patient know of negative lab results. GC/CT also negative.    Thanks,  Peter

## 2025-01-31 ENCOUNTER — TELEPHONE (OUTPATIENT)
Dept: INTERNAL MEDICINE CLINIC | Facility: CLINIC | Age: 65
End: 2025-01-31

## 2025-03-06 DIAGNOSIS — I10 PRIMARY HYPERTENSION: ICD-10-CM

## 2025-03-06 RX ORDER — CANDESARTAN 16 MG/1
16 TABLET ORAL DAILY
Qty: 30 TABLET | Refills: 0 | Status: SHIPPED | OUTPATIENT
Start: 2025-03-06 | End: 2025-03-12 | Stop reason: SDUPTHER

## 2025-03-12 ENCOUNTER — OFFICE VISIT (OUTPATIENT)
Dept: FAMILY MEDICINE CLINIC | Facility: CLINIC | Age: 65
End: 2025-03-12

## 2025-03-12 VITALS
OXYGEN SATURATION: 97 % | HEART RATE: 71 BPM | SYSTOLIC BLOOD PRESSURE: 136 MMHG | HEIGHT: 60 IN | TEMPERATURE: 96.9 F | DIASTOLIC BLOOD PRESSURE: 78 MMHG | WEIGHT: 151.7 LBS | BODY MASS INDEX: 29.78 KG/M2 | RESPIRATION RATE: 16 BRPM

## 2025-03-12 DIAGNOSIS — R73.03 PREDIABETES: ICD-10-CM

## 2025-03-12 DIAGNOSIS — Z12.31 ENCOUNTER FOR SCREENING MAMMOGRAM FOR BREAST CANCER: ICD-10-CM

## 2025-03-12 DIAGNOSIS — I10 PRIMARY HYPERTENSION: Primary | ICD-10-CM

## 2025-03-12 DIAGNOSIS — R19.06 EPIGASTRIC MASS: ICD-10-CM

## 2025-03-12 PROCEDURE — 99214 OFFICE O/P EST MOD 30 MIN: CPT | Performed by: FAMILY MEDICINE

## 2025-03-12 RX ORDER — AMLODIPINE BESYLATE 5 MG/1
5 TABLET ORAL DAILY
Qty: 30 TABLET | Refills: 3 | Status: SHIPPED | OUTPATIENT
Start: 2025-03-12

## 2025-03-12 RX ORDER — CANDESARTAN 16 MG/1
16 TABLET ORAL DAILY
Qty: 30 TABLET | Refills: 3 | Status: SHIPPED | OUTPATIENT
Start: 2025-03-12

## 2025-03-12 NOTE — ASSESSMENT & PLAN NOTE
Well-controlled  At goal of less than 140/90  Continue candesartan and amlodipine    Orders:    Lipid panel; Future    amLODIPine (NORVASC) 5 mg tablet; Take 1 tablet (5 mg total) by mouth daily    candesartan (ATACAND) 16 mg tablet; Take 1 tablet (16 mg total) by mouth daily

## 2025-03-12 NOTE — PROGRESS NOTES
Name: Cornelia Tirado      : 1960      MRN: [id number]  Encounter Provider: Shukri Mckeon MD  Encounter Date: 3/12/2025   Encounter department: [facility]  :  Assessment & Plan  Primary hypertension  Well-controlled  At goal of less than 140/90  Continue candesartan and amlodipine    Orders:    Lipid panel; Future    amLODIPine (NORVASC) 5 mg tablet; Take 1 tablet (5 mg total) by mouth daily    candesartan (ATACAND) 16 mg tablet; Take 1 tablet (16 mg total) by mouth daily    Prediabetes  Most recent hemoglobin A1c of 5.9  I did recommend lifestyle modifications such as increasing physical activity and low carbohydrate diet  Recheck with me A1c  Orders:    Hemoglobin A1C; Future    Comprehensive metabolic panel; Future    Encounter for screening mammogram for breast cancer    Orders:    Mammo screening bilateral w 3d and cad; Future    Epigastric mass  Chronic epigastric abdominal mass with discomfort  Will check abdominal ultrasound for further evaluation  Orders:    US abdomen limited; Future           History of Present Illness   65-year-old female with history of prediabetes, obesity and hypertension who presents today for follow-up.  She is doing okay overall.  She has noted a lump in her epigastric abdominal area.  She states that she noticed this a few months ago.  She states that the lump is painful to touch.      Review of Systems   Constitutional:  Negative for chills, diaphoresis, fatigue and fever.   HENT:  Negative for congestion and sore throat.    Respiratory:  Negative for cough, shortness of breath and wheezing.    Cardiovascular:  Negative for chest pain, palpitations and leg swelling.   Gastrointestinal:  Negative for abdominal pain, diarrhea, nausea and vomiting.   Genitourinary:  Negative for dysuria, hematuria and urgency.   Musculoskeletal:  Negative for arthralgias.   Skin:  Negative for rash.   Neurological:  Negative for seizures  and syncope.       Objective   /78 (BP Location: Left arm, Patient Position: Sitting, Cuff Size: Standard)   Pulse 71   Temp (!) 96.9 °F (36.1 °C) (Temporal)   Resp 16   Ht 5' (1.524 m)   Wt 68.8 kg (151 lb 11.2 oz)   SpO2 97%   BMI 29.63 kg/m²      Physical Exam  Vitals and nursing note reviewed.   Constitutional:       General: She is not in acute distress.     Appearance: Normal appearance. She is well-developed. She is obese. She is not ill-appearing, toxic-appearing or diaphoretic.   HENT:      Head: Normocephalic and atraumatic.      Right Ear: External ear normal.      Left Ear: External ear normal.      Nose: Nose normal.      Mouth/Throat:      Mouth: Mucous membranes are moist.   Eyes:      General: No scleral icterus.        Right eye: No discharge.         Left eye: No discharge.      Extraocular Movements: Extraocular movements intact.      Conjunctiva/sclera: Conjunctivae normal.   Cardiovascular:      Rate and Rhythm: Normal rate and regular rhythm.      Pulses: Normal pulses.      Heart sounds: Normal heart sounds. No murmur heard.     No friction rub. No gallop.   Pulmonary:      Effort: Pulmonary effort is normal. No respiratory distress.      Breath sounds: Normal breath sounds. No stridor. No wheezing or rhonchi.   Abdominal:      General: Bowel sounds are normal. There is no distension.      Palpations: Abdomen is soft. There is mass (Palpable mass in the epigastric area.  Slightly tender on palpation).      Tenderness: There is abdominal tenderness (epigatric). There is no guarding or rebound.      Hernia: No hernia is present.   Musculoskeletal:         General: Normal range of motion.      Cervical back: Normal range of motion.   Skin:     General: Skin is warm.          Neurological:      General: No focal deficit present.      Mental Status: She is alert and oriented to person, place, and time. Mental status is at baseline.      Gait: Gait normal.   Psychiatric:         Mood  and Affect: Mood normal.         Behavior: Behavior normal.

## 2025-03-12 NOTE — ASSESSMENT & PLAN NOTE
Most recent hemoglobin A1c of 5.9  I did recommend lifestyle modifications such as increasing physical activity and low carbohydrate diet  Recheck with me A1c  Orders:    Hemoglobin A1C; Future    Comprehensive metabolic panel; Future

## 2025-05-15 ENCOUNTER — APPOINTMENT (OUTPATIENT)
Dept: LAB | Facility: CLINIC | Age: 65
End: 2025-05-15

## 2025-05-15 DIAGNOSIS — I10 PRIMARY HYPERTENSION: ICD-10-CM

## 2025-05-15 DIAGNOSIS — R73.03 PREDIABETES: ICD-10-CM

## 2025-05-15 LAB
ALBUMIN SERPL BCG-MCNC: 4.4 G/DL (ref 3.5–5)
ALP SERPL-CCNC: 83 U/L (ref 34–104)
ALT SERPL W P-5'-P-CCNC: 16 U/L (ref 7–52)
ANION GAP SERPL CALCULATED.3IONS-SCNC: 5 MMOL/L (ref 4–13)
AST SERPL W P-5'-P-CCNC: 24 U/L (ref 13–39)
BILIRUB SERPL-MCNC: 0.45 MG/DL (ref 0.2–1)
BUN SERPL-MCNC: 10 MG/DL (ref 5–25)
CALCIUM SERPL-MCNC: 10 MG/DL (ref 8.4–10.2)
CHLORIDE SERPL-SCNC: 103 MMOL/L (ref 96–108)
CHOLEST SERPL-MCNC: 202 MG/DL (ref ?–200)
CO2 SERPL-SCNC: 32 MMOL/L (ref 21–32)
CREAT SERPL-MCNC: 0.71 MG/DL (ref 0.6–1.3)
EST. AVERAGE GLUCOSE BLD GHB EST-MCNC: 128 MG/DL
GFR SERPL CREATININE-BSD FRML MDRD: 90 ML/MIN/1.73SQ M
GLUCOSE P FAST SERPL-MCNC: 91 MG/DL (ref 65–99)
HBA1C MFR BLD: 6.1 %
HDLC SERPL-MCNC: 50 MG/DL
LDLC SERPL CALC-MCNC: 133 MG/DL (ref 0–100)
NONHDLC SERPL-MCNC: 152 MG/DL
POTASSIUM SERPL-SCNC: 4.5 MMOL/L (ref 3.5–5.3)
PROT SERPL-MCNC: 8.1 G/DL (ref 6.4–8.4)
SODIUM SERPL-SCNC: 140 MMOL/L (ref 135–147)
TRIGL SERPL-MCNC: 93 MG/DL (ref ?–150)

## 2025-05-15 PROCEDURE — 80061 LIPID PANEL: CPT

## 2025-05-15 PROCEDURE — 80053 COMPREHEN METABOLIC PANEL: CPT

## 2025-05-15 PROCEDURE — 83036 HEMOGLOBIN GLYCOSYLATED A1C: CPT

## 2025-05-15 PROCEDURE — 36415 COLL VENOUS BLD VENIPUNCTURE: CPT

## 2025-08-14 ENCOUNTER — OFFICE VISIT (OUTPATIENT)
Dept: FAMILY MEDICINE CLINIC | Facility: CLINIC | Age: 65
End: 2025-08-14